# Patient Record
Sex: FEMALE | URBAN - METROPOLITAN AREA
[De-identification: names, ages, dates, MRNs, and addresses within clinical notes are randomized per-mention and may not be internally consistent; named-entity substitution may affect disease eponyms.]

---

## 2019-03-07 ENCOUNTER — IMPORTED ENCOUNTER (OUTPATIENT)
Dept: URBAN - METROPOLITAN AREA CLINIC 1 | Facility: CLINIC | Age: 54
End: 2019-03-07

## 2019-03-07 PROBLEM — H01.8: Noted: 2019-03-07

## 2019-03-07 PROBLEM — H00.034: Noted: 2019-03-07

## 2019-03-07 PROBLEM — H02.844: Noted: 2019-03-07

## 2019-03-07 PROBLEM — H11.152: Noted: 2019-03-07

## 2019-03-07 PROBLEM — H25.813: Noted: 2019-03-07

## 2019-03-07 PROBLEM — H04.123: Noted: 2019-03-07

## 2019-03-07 PROCEDURE — 92002 INTRM OPH EXAM NEW PATIENT: CPT

## 2019-03-07 NOTE — PATIENT DISCUSSION
Preseptal cellulitis OS - The patient's left eyelids were inflammed due to an infection. The deeper orbital tissues were not involved. Treatment with oral and topical antibiotics and warm compresses was recommemded and very close follow up was scheduled.

## 2019-03-07 NOTE — PATIENT DISCUSSION
1.  Abscessed Chalazion REG w/ Cellulitis (PMG saw) -- The patient's left eyelid was inflammed due to an infection. The deeper orbital tissues were not involved. Start Clindamycin PO BID. Continue Polymyxin Q3h OS and Bactrim PO BID (both RX written by other physician). Recommend the use of OTC ATs PRN. Hot compresses were recommemded. Possible I&D if no improvement. 2. Chalazion RUL -- Resolving. 3. SHAZIA OU -- Recommend the use of ATs BID OU. (Sample of Blink given)4. Pinguecula OS -- Use of sunglasses when exposed to UV light and observation is recommended. 5. Cataracts OU -- Observe. Return for an appointment in Wednesday 10 possible I&D with Dr. Shailesh Hough.

## 2019-03-13 ENCOUNTER — IMPORTED ENCOUNTER (OUTPATIENT)
Dept: URBAN - METROPOLITAN AREA CLINIC 1 | Facility: CLINIC | Age: 54
End: 2019-03-13

## 2019-03-13 PROCEDURE — 67700 BLEPHAROTOMY DRG ABSC EYELID: CPT

## 2019-03-13 PROCEDURE — 67800 REMOVE EYELID LESION: CPT

## 2019-03-13 NOTE — PATIENT DISCUSSION
Chalazion abscessed left upper eyelid - Hot compresses TID x 5 minutes for 3 weeks. If without improvement discussed with patient possible Incision and Drainage procedure. Risks and benefits discussed with patient and patient states full understanding.

## 2019-03-13 NOTE — PATIENT DISCUSSION
Incision and drainage of chalazion on right upper eyelid:  After proper informed consent was obtained the patient was taken to the operating room and placed supine on the operating table. The right upper eye lid was prepped in the standard surgical fashion. Xylocaine 1% and Epinephrine was infiltrated around the chalazion. A chalazion speculum was then placed and the eyelid was everted. A cross incision was made through the chalazion and immediate release of the lipogranulomatous material was expressed. A curette was used to further evacuate the chalazion cavity. The speculum was removed ointment was applied and a pressure patch was placed. The patient tolerated the procedure well and there were no complications. The patient was instructed to use Maxitrol nina qhs OD.

## 2019-03-13 NOTE — PATIENT DISCUSSION
Chalazion RUL--Patient was compliant with hot compresses without resolution. Discussed option for Incision and Drainage. Risks and Benefits discussed with patient. Patient stated complete understanding and would like to proceed with procedure.

## 2019-03-13 NOTE — PATIENT DISCUSSION
Incision and drainage of abscessed chalazion on left upper eyelid: After proper informed consent was obtained the patient was taken to the operating room and placed supine on the operating table. The left upper eyelid was prepped in the standard surgical fashion. Xylocaine 1% and Epinephrine was infiltrated around the chalazion. A chalazion speculum was then placed and the eyelid was everted. A cross incision was made through the chalazion and immediate release of the lipogranulomatous material was expressed. A curette was used to further evacuate the chalazion cavity. The speculum was removed Maxitrol ointment was applied and a pressure patch was placed. The patient tolerated the procedure well and there were no complications. The patient was instructed to use INSERT NAME OF OINTMENT HERE  and warm compresses BID.

## 2019-03-13 NOTE — PATIENT DISCUSSION
1.  Chalazion Abscessed REG -- Patient was compliant with hot compresses and Doxy PO without resolution. Discussed option for Incision and Drainage. Risks and Benefits discussed with patient. Patient stated complete understanding and would like to proceed with procedure today in office. 2.  Chalazion RUL--Patient was compliant with hot compresses without resolution. Discussed option for Incision and Drainage. Risks and Benefits discussed with patient. Patient stated complete understanding and would like to proceed with procedure in office today. Incision and drainage of abscessed chalazion on left upper eyelid and chalazion on right upper eyelid. After proper informed consent was obtained the patient was taken to the operating room and placed supine on the operating table. The left upper eyelid and right upper eyelid were prepped in the standard surgical fashion. Xylocaine 1% and Epinephrine was infiltrated around the chalazions. A chalazion speculum was then placed REG. A cross incision was made through the chalazion and immediate release of the lipogranulomatous material was expressed. A curette was used to further evacuate the chalazion cavity. The speculum was removed Maxitrol ointment was applied and a pressure patch was placed. A chalazion speculum was then placed RUL. A cross incision was made through the chalazion and immediate release of the lipogranulomatous material was expressed. A curette was used to further evacuate the chalazion cavity. The speculum was removed Maxitrol ointment was applied. No patch was placed due to minimal bleeding. PT was instructed to use Neosporin nina BID REG and RUL x 1 week. RTC: 1 month with Dr. Scarlet French for 30.

## 2019-03-14 PROBLEM — H00.034: Noted: 2019-03-13

## 2019-03-14 PROBLEM — H00.11: Noted: 2019-03-13

## 2019-03-14 PROBLEM — H00.11: Noted: 2019-03-14

## 2019-04-05 ENCOUNTER — IMPORTED ENCOUNTER (OUTPATIENT)
Dept: URBAN - METROPOLITAN AREA CLINIC 1 | Facility: CLINIC | Age: 54
End: 2019-04-05

## 2019-04-05 PROBLEM — H01.004: Noted: 2019-04-05

## 2019-04-05 PROBLEM — H00.11: Noted: 2019-04-05

## 2019-04-05 PROBLEM — H01.005: Noted: 2019-04-05

## 2019-04-05 PROBLEM — H04.123: Noted: 2019-04-05

## 2019-04-05 PROBLEM — H00.14: Noted: 2019-04-05

## 2019-04-05 PROBLEM — H25.13: Noted: 2019-04-05

## 2019-04-05 PROBLEM — H11.152: Noted: 2019-04-05

## 2019-04-05 PROBLEM — H17.821: Noted: 2019-04-05

## 2019-04-05 PROBLEM — H01.002: Noted: 2019-04-05

## 2019-04-05 PROBLEM — H01.001: Noted: 2019-04-05

## 2019-04-05 PROCEDURE — 92012 INTRM OPH EXAM EST PATIENT: CPT

## 2019-04-05 NOTE — PATIENT DISCUSSION
1.  Recurrent RUL & REG Chalazion -- Not compliant w/ hot compresses this past week. Restart Hot Moist Compresses TID x 5 minutes for 3 weeks. If without improvement discussed with patient possible Incision and Drainage procedure. Risks and benefits discussed with patient and patient states full understanding. Begin Ocusoft Hypochlor Spray BID to lids. Begin Doxycycline 100mg PO Qday X's 30 days (Dispensed #30 & ERx'd). If compliant w/ no improvement will plan for I&D when returns in 3 weeks. 2.  Anterior Blepharitis OU - Daily Hot compresses and lid scrubs were recommended. 3. Dry Eyes OU -The use/continuation of artificial tears were recommended. 4.  Peripheral Corneal Scar OD 5. Cataracts OU -- Observe for now without intervention. The patient was advised to contact us if any change or worsening of vision. 6. Pinguecula OS -- Use of sunglasses when exposed to UV light and observation is recommended. Return for an appointment in 3 WKS for a 10 / Possible I&D (Wednesday Afternoon) with Dr. Laquita Isaac.

## 2019-04-05 NOTE — PATIENT DISCUSSION
Chalazion left upper eyelid -Hot compresses TID x 5 minutes for 3 weeks. If without improvement discussed with patient possible Incision and Drainage procedure. Risks and benefits discussed with patient and patient states full understanding.

## 2019-04-05 NOTE — PATIENT DISCUSSION
Chalazion REG--Patient was compliant with hot compresses without resolution. Discussed option for Incision and Drainage. Risks and Benefits discussed with patient. Patient stated complete understanding and would like to proceed with procedure.

## 2019-05-08 ENCOUNTER — IMPORTED ENCOUNTER (OUTPATIENT)
Dept: URBAN - METROPOLITAN AREA CLINIC 1 | Facility: CLINIC | Age: 54
End: 2019-05-08

## 2019-05-08 PROCEDURE — 99213 OFFICE O/P EST LOW 20 MIN: CPT

## 2019-07-19 ENCOUNTER — IMPORTED ENCOUNTER (OUTPATIENT)
Dept: URBAN - METROPOLITAN AREA CLINIC 1 | Facility: CLINIC | Age: 54
End: 2019-07-19

## 2019-07-19 PROBLEM — H00.11: Noted: 2019-07-19

## 2019-07-19 PROBLEM — H00.14: Noted: 2019-07-19

## 2019-07-19 PROBLEM — H00.12: Noted: 2019-07-19

## 2019-07-19 PROCEDURE — 92012 INTRM OPH EXAM EST PATIENT: CPT

## 2019-07-19 NOTE — PATIENT DISCUSSION
1.  Recurrent RUL RLL & REG Chalazion -- Hot compresses TID x 5 minutes for 3 weeks. If without improvement discussed with patient possible Incision and Drainage procedure. Risks and benefits discussed with patient and patient states full understanding. Begin Doxycycline 50mg Qday X's 10 days (ERx'd). Return for an appointment on Wednesday 7/31/19 for 10 / Possible I&D with Dr. Zoltan Sweeney.

## 2019-07-31 ENCOUNTER — IMPORTED ENCOUNTER (OUTPATIENT)
Dept: URBAN - METROPOLITAN AREA CLINIC 1 | Facility: CLINIC | Age: 54
End: 2019-07-31

## 2019-07-31 PROCEDURE — 11900 INJECT SKIN LESIONS </W 7: CPT

## 2019-07-31 PROCEDURE — 67805 REMOVE EYELID LESIONS: CPT

## 2019-07-31 PROCEDURE — 67800 REMOVE EYELID LESION: CPT

## 2019-07-31 NOTE — PATIENT DISCUSSION
Removal of Multiple Chalazion Different Lids (RUL and REG): The patient was taken to the operating room and placed supine on the operating table. The eyes were prepped and draped in the standard surgical fashion. Xylocaine 1% and Epinephrine was infiltrated around the chalazia. A chalazion speculum was then placed and the eyelid was everted. A cross incision was made through the chalazia and immediate release of the lipogranulomatous material was expressed. A curet was used to further evacuate the chalazia cavities and a kenalog injection was performed to RUL and REG. The speculum was removed and ointment was applied. A pressure patch was then placed. The patient tolerated the procedure well and there were no complications. The patient was instructed to use Maxitrol ointment BID OU x10 days then DC (erx). Begin Doxycycline 50mg PO QD x 1 month disp 30 (erx). The patient was instructed to use Hot compresses BID to lids continuously. Return for an appointment in 1 month 10 with Dr. Domi Ackerman.

## 2022-04-02 ASSESSMENT — VISUAL ACUITY
OS_CC: 20/25
OD_CC: 20/20
OD_CC: 20/20
OD_CC: 20/20-2
OS_CC: 20/25
OS_CC: 20/25
OD_CC: 20/25-2
OS_CC: 20/25

## 2022-04-02 ASSESSMENT — TONOMETRY
OS_IOP_MMHG: 12
OD_IOP_MMHG: 13
OD_IOP_MMHG: 10
OS_IOP_MMHG: 13

## 2022-06-30 ENCOUNTER — TELEPHONE (OUTPATIENT)
Dept: PHYSICAL THERAPY | Age: 57
End: 2022-06-30

## 2022-07-05 ENCOUNTER — HOSPITAL ENCOUNTER (OUTPATIENT)
Dept: PHYSICAL THERAPY | Age: 57
Discharge: HOME | End: 2022-07-05
Payer: COMMERCIAL

## 2022-07-05 PROCEDURE — 97535 SELF CARE MNGMENT TRAINING: CPT

## 2022-07-05 PROCEDURE — 97110 THERAPEUTIC EXERCISES: CPT

## 2022-07-05 PROCEDURE — 97162 PT EVAL MOD COMPLEX 30 MIN: CPT

## 2022-07-05 NOTE — PROGRESS NOTES
PT DAILY TREATMENT NOTE 10-18    Patient Name: Nicola Mcconnell  Date:2022  : 1965  [x]  Patient  Verified  Payor: BLUE CROSS / Plan: [facility] PPO / Product Type: PPO /    In time:210  Out time:245  Total Treatment Time (min): 35  Visit #: 1 of 8    Medicare/BCBS Only   Total Timed Codes (min):  23 1:1 Treatment Time:  35       Treatment Area: Right lumbar radiculopathy [M54.16]    SUBJECTIVE  Pain Level (0-10 scale): 6-7  Any medication changes, allergies to medications, adverse drug reactions, diagnosis change, or new procedure performed?: [x] No    [] Yes (see summary sheet for update)  Subjective functional status/changes:   [] No changes reported  See eval    OBJECTIVE      12 min [x]Eval                  []Re-Eval       15 min Therapeutic Exercise:  [] See flow sheet :   Rationale: increase ROM and increase strength to improve the patients ability to perform daily activities   8 min Self Care/Home Management: HEP progression; positioning    Rationale: increase ROM and decrease pain  to improve the patients ability to perform daily activities        With   [] TE   [] TA   [] neuro   [] other: Patient Education: [x] Review HEP    [] Progressed/Changed HEP based on:   [] positioning   [] body mechanics   [] transfers   [] heat/ice application    [] other:      Other Objective/Functional Measures:      Pain Level (0-10 scale) post treatment: 5    ASSESSMENT/Changes in Function: see POC    Patient will continue to benefit from skilled PT services to modify and progress therapeutic interventions, address functional mobility deficits, address strength deficits, analyze and address soft tissue restrictions, analyze and cue movement patterns and assess and modify postural abnormalities to attain remaining goals. [x]  See Plan of Care  []  See progress note/recertification  []  See Discharge Summary         Progress towards goals / Updated goals:  Short Term Goals:  To be accomplished in 1 weeks:  1. I and compliant with HEP for self management of symptoms. IE:issued HEP  Long Term Goals: To be accomplished in 6 weeks:  1. Improve FOTO to 63 to indicate improved function with daily activities. IE:46   2. Patient will report >=50% improvement with right radicular symptoms to increase ease with standing longer than 1 hour. IE;0%  3. Increase right hip and hamstring strength to 4+/5 to improve stability for ambulating several blocks.   IE: 4-/5 grossly   PLAN  []  Upgrade activities as tolerated     [x]  Continue plan of care  []  Update interventions per flow sheet       []  Discharge due to:_  []  Other:_      Melani Davis, MPT, CMTPT 7/5/2022  3:06 PM    Future Appointments   Date Time Provider Paula Kassie   7/12/2022 12:45 PM Chung, [address] [facility]   7/19/2022 12:45 [facility]   7/26/2022  8:00 AM Ted Hess MD [facility] BS AMB   7/26/2022 10:30 AM [facility]   8/2/2022  9:45 AM Eric Schafer, PT [facility]   8/9/2022  9:45 AM Chung, [address] [facility]

## 2022-07-05 NOTE — PROGRESS NOTES
[facility]  [address]  [place], [address]  [phone] fax    Plan of Care/ Statement of Necessity for Physical Therapy Services    Patient name: Nicola Mcconnell Start of Care: 2022   Referral source: Marcus Soler MD : 1965    Medical Diagnosis: Right lumbar radiculopathy [M54.16]  Payor: BLUE CROSS / Plan: [facility] [id number] PPO / Product Type: PPO /  Onset Date:1 year ago with exacerbation 1 month ago    Treatment Diagnosis: LBP with right LE radiculopathy   Prior Hospitalization: see medical history Provider#: [id number]   Medications: Verified on Patient summary List    Comorbidities: Depression ;OA   Prior Level of Function: Able to tolerate prolonged positions; works at [address] [place] [address] and following information is based on the information from the initial evaluation. Assessment/ key information: 61y.o. year old female presents with CC of chronic LBP and right LE radiculopathy with recent exacerbation of symptoms 1 month ago; patient also present with significant right ankle sprain with notable edema and bruising around lateral malleolus and 3rd-5th rays (prescription is only for LBP). Impairments noted today: increased mm restrictions and hypertonicity noted in right T-L paraspinals, QL, and glutes/piriformis; poor TA recruitment and strength; decreased right hip and hamstring strength on right compared to left; decreased hip IR/ER compared to left. Patient will benefit from physical therapy to address deficits, and ultimately to return patient to prior level of function.     Evaluation Complexity History MEDIUM  Complexity : 1-2 comorbidities / personal factors will impact the outcome/ POC ; Examination MEDIUM Complexity : 3 Standardized tests and measures addressing body structure, function, activity limitation and / or participation in recreation  ;Presentation MEDIUM Complexity : Evolving with changing characteristics  ; Clinical Decision Making MEDIUM Complexity : FOTO score of 26-74  Overall Complexity Rating: MEDIUM  Problem List: pain affecting function, decrease strength, decrease ADL/ functional abilitiies, decrease activity tolerance and decrease flexibility/ joint mobility   Treatment Plan may include any combination of the following: Therapeutic exercise, Neuromuscular re-education, Physical agent/modality, Manual therapy and Patient education  Patient / Family readiness to learn indicated by: asking questions, trying to perform skills and interest  Persons(s) to be included in education: patient (P)  Barriers to Learning/Limitations: None  Patient Goal (s): no more pain  Patient Self Reported Health Status: poor  Rehabilitation Potential: good    Short Term Goals: To be accomplished in 1 weeks:  1. I and compliant with HEP for self management of symptoms. Long Term Goals: To be accomplished in 6 weeks:  1. Improve FOTO to 63 to indicate improved function with daily activities. 2. Patient will report >=50% improvement with right radicular symptoms to increase ease with standing longer than 1 hour. 3. Increase right hip and hamstring strength to 4+/5 to improve stability for ambulating several blocks. Frequency / Duration: Patient to be seen 1 times per week for 6 weeks. Patient/ Caregiver education and instruction: Diagnosis, prognosis, exercises   [x]  Plan of care has been reviewed with ABBY Gonzalez, PT 7/5/2022 2:55 PM    ________________________________________________________________________    I certify that the above Therapy Services are being furnished while the patient is under my care. I agree with the treatment plan and certify that this therapy is necessary.     Physician's Signature:____________Date:_________TIME:________     Maine Soler MD  ** Signature, Date and Time must be completed for valid certification **    Please sign and return to [facility]  [address]  [place], [address]  [phone] fax

## 2022-07-12 ENCOUNTER — HOSPITAL ENCOUNTER (OUTPATIENT)
Dept: PHYSICAL THERAPY | Age: 57
Discharge: HOME | End: 2022-07-12
Payer: COMMERCIAL

## 2022-07-12 PROCEDURE — 97110 THERAPEUTIC EXERCISES: CPT

## 2022-07-12 PROCEDURE — 97140 MANUAL THERAPY 1/> REGIONS: CPT

## 2022-07-12 PROCEDURE — 97112 NEUROMUSCULAR REEDUCATION: CPT

## 2022-07-12 NOTE — PROGRESS NOTES
PT DAILY TREATMENT NOTE     Patient Name: Charity Izaguirre  Date:2022  : 1965  [x]  Patient  Verified  Payor: BLUE CROSS / Plan: [facility] PPO / Product Type: PPO /    In time:1121am  Out time:1210am  Total Treatment Time (min): 49  Visit #: 2 of 8    Medicare/BCBS Only   Total Timed Codes (min):  49 1:1 Treatment Time:  38       Treatment Area: Right lumbar radiculopathy [M54.16]    SUBJECTIVE  Pain Level (0-10 scale): 7.5  Any medication changes, allergies to medications, adverse drug reactions, diagnosis change, or new procedure performed?: [x] No    [] Yes (see summary sheet for update)  Subjective functional status/changes:   [] No changes reported  Reports continued pain, numbness, and tingling. OBJECTIVE    Modality rationale: decrease pain and increase tissue extensibility to improve the patients ability to manage ADLs. Min Type Additional Details   10 []  Ice     [x]  heat  []  Ice massage  []  Laser   []  Anodyne Position: prone  Location: right hip/low back   [] Skin assessment post-treatment:  []intact []redness- no adverse reaction    []redness - adverse reaction:     21 min Therapeutic Exercise:  [x] See flow sheet :   Rationale: increase ROM and increase strength to improve the patients ability to manage ADLs. 10 min Neuromuscular Re-education:  [x]  See flow sheet :   Rationale: increase strength, improve coordination, improve balance and increase proprioception  to improve the patients ability to manage ambulatory ADLs with improved stability and reduced radicular sx.    8 min Manual Therapy:  STM right QL and piri   The manual therapy interventions were performed at a separate and distinct time from the therapeutic activities interventions. Rationale: decrease pain, increase ROM and increase tissue extensibility to improve ease of managing functional activities.           With   [] TE   [] TA   [] neuro   [] other: Patient Education: [x] Review You have a telephone follow up appointment scheduled for July 6.   will call you between 11a - 2p.    Discharge Instructions:  1.You should take it easy once you are home. Avoid sitting for any prolonged period of time (no longer than one hour at a time without standing up and stretching).    2. If you are uncomfortable, you may apply ice to the injection site, 20 min on, 1hr off.    3. Do not drive a vehicle or heavy machinery after the procedure.    4. You may resume your normal activity the day after the procedure. Remember that it can take up to 10 days before you begin to feel the effects of the steroid.    5. Do gentle stretches thru out the day.    6. You may restart all medications after the medication, unless noted elsewhere.    7. Be aware that if you are diabetic your blood sugar could increase due to the steroid, follow up with your primary doctor if blood sugar greater than 300.        PRE PROCEDURE INSTRUCTIONS    Stop all Non Steroidal Anti-inflammatory medication two days before your procedure. The list includes but is not limited to the following:    Ibuprofen (Advil, Motrin, Midol IB)   Aspirin, Aspirin containing products (Gibran, Ascriptin, Ecotrin, Bufferin),   Celecoxib (celebrex)  Naproxen (Aleve, Midol Extended Release, Naprosyn),   Ketorolac (Sprix)  Indomethacin (Indocin,)  Meloxicam (Mobic),   Diclofenac (Voltaren)    Stop ALL Aspirin and ALL NSAIDs two days before your procedure.    Nothing to eat or drink _4_ hours before your procedure.     NOTE: If you are diabetic, please do not take your insulin or diabetes medication as you will not be eating before your procedure.    Take your blood pressure/heart medication with a sip of water the morning of your procedure. Your procedure will be cancelled if your blood pressure is too high.    Shower with antibacterial soap (such as dial) the morning of your procedure.    Call to cancel if you are ill or on antibiotics for ANY reason.  [phone]    You MUST have a  accompany you to drive you home.  You CANNOT drive after your procedure.    If your  is not with you when you arrive for your procedure, YOUR PROCEDURE WILL BE CANCELLED.    The pain clinic phone number is [phone].        You will always get our voice mail when you call, please leave a message.   Please leave your name, date of birth and phone number on the voicemail to ensure we can identify you.    The pain clinic will return your call within 2 business days.    HEP    [] Progressed/Changed HEP based on:   [] positioning   [] body mechanics   [] transfers   [] heat/ice application    [] other:      Other Objective/Functional Measures: exercises initiated for first f/u per flowsheet     Pain Level (0-10 scale) post treatment: 7.5    ASSESSMENT/Changes in Function: Pt reports improved pain following manual therapy, despite same pain report upon leaving. Right QL and piri stretch are the most intense activities for her today. Will benefit from continued core and glute work to reduce her pain. Patient will continue to benefit from skilled PT services to modify and progress therapeutic interventions, address functional mobility deficits, address ROM deficits, address strength deficits, analyze and address soft tissue restrictions, analyze and cue movement patterns, analyze and modify body mechanics/ergonomics, assess and modify postural abnormalities, address imbalance/dizziness and instruct in home and community integration to attain remaining goals. []  See Plan of Care  []  See progress note/recertification  []  See Discharge Summary         Progress towards goals / Updated goals:  Short Term Goals: To be accomplished in 1 weeks:  1. I and compliant with HEP for self management of symptoms.   IE:issued HEP      Long Term Goals: To be accomplished in 6 weeks:  1. Improve FOTO to 63 to indicate improved function with daily activities. IE:46   2. Patient will report >=50% improvement with right radicular symptoms to increase ease with standing longer than 1 hour. IE;0%  Current: no change (7/12/2022)  3. Increase right hip and hamstring strength to 4+/5 to improve stability for ambulating several blocks.   IE: 4-/5 grossly     PLAN  []  Upgrade activities as tolerated     [x]  Continue plan of care  []  Update interventions per flow sheet        []  Discharge due to:_  []  Other:_      General Vela PT 7/12/2022  11:26 AM    Future Appointments   Date Time Provider Paula Fernando   7/19/2022 12:45 PM Lizabeth Paez [facility]   7/26/2022  8:00 AM Madisyn Mckinley MD [facility] BS AMB   7/26/2022 10:30 AM Morgan Gomez, PT [facility]   8/2/2022  9:45 AM Morgan Gomez, PT [facility]   8/9/2022  9:45 AM Chung[address]

## 2022-07-19 ENCOUNTER — APPOINTMENT (OUTPATIENT)
Dept: PHYSICAL THERAPY | Age: 57
End: 2022-07-19
Payer: COMMERCIAL

## 2022-07-19 ENCOUNTER — TELEPHONE (OUTPATIENT)
Dept: PHYSICAL THERAPY | Age: 57
End: 2022-07-19

## 2022-07-26 ENCOUNTER — HOSPITAL ENCOUNTER (OUTPATIENT)
Dept: PHYSICAL THERAPY | Age: 57
Discharge: HOME | End: 2022-07-26
Payer: COMMERCIAL

## 2022-07-26 PROBLEM — K29.70 GASTRITIS: Status: ACTIVE | Noted: 2022-07-26

## 2022-07-26 PROBLEM — M47.816 LUMBAR FACET ARTHROPATHY: Status: ACTIVE | Noted: 2022-07-26

## 2022-07-26 PROBLEM — K76.0 NAFLD (NONALCOHOLIC FATTY LIVER DISEASE): Status: ACTIVE | Noted: 2022-07-26

## 2022-07-26 PROBLEM — M50.90 CERVICAL DISC DISEASE: Status: ACTIVE | Noted: 2022-07-26

## 2022-07-26 PROCEDURE — 97112 NEUROMUSCULAR REEDUCATION: CPT

## 2022-07-26 PROCEDURE — 97110 THERAPEUTIC EXERCISES: CPT

## 2022-07-26 PROCEDURE — 97140 MANUAL THERAPY 1/> REGIONS: CPT

## 2022-07-26 NOTE — PROGRESS NOTES
PT DAILY TREATMENT NOTE 10-18    Patient Name: Gretchen Inman  Date:2022  : 1965  [x]  Patient  Verified  Payor: BLUE CROSS / [facility] / Product Type: PPO /    In time:1030  Out time:1120  Total Treatment Time (min): 50  Visit #: 3 of 8    Medicare/BCBS Only   Total Timed Codes (min):  40 1:1 Treatment Time:  40       Treatment Area: Right lumbar radiculopathy [M54.16]    SUBJECTIVE  Pain Level (0-10 scale): 6-7  Any medication changes, allergies to medications, adverse drug reactions, diagnosis change, or new procedure performed?: [x] No    [] Yes (see summary sheet for update)  Subjective functional status/changes:   [] No changes reported  Not much change. OBJECTIVE    Modality rationale: decrease pain to improve the patients ability to tolerate post exercise soreness   10 []  Ice     [x]  heat  []  Ice massage  []  Laser   []  Anodyne Position:prone  Location:right T-L paraspinals   [] Skin assessment post-treatment:  []intact []redness- no adverse reaction    []redness - adverse reaction:         20 min Therapeutic Exercise:  [x] See flow sheet :   Rationale: increase ROM and increase strength to improve the patients ability to perform daily activities      12 min Neuromuscular Re-education:  [x]  See flow sheet :   Rationale: increase strength  to improve the patients ability to recruit TA and gltues for daily activities     8 min Manual Therapy:  grade 2-3 T-L PAs; STM right T-L paraspinals and QL   The manual therapy interventions were performed at a separate and distinct time from the therapeutic activities interventions.   Rationale: decrease pain, increase ROM, increase tissue extensibility, and decrease trigger points to relax mm for daily activities   With   [] TE   [] TA   [] neuro   [] other: Patient Education: [x] Review HEP    [] Progressed/Changed HEP based on:   [] positioning   [] body mechanics   [] transfers   [] heat/ice application    [] other: Other Objective/Functional Measures:      Pain Level (0-10 scale) post treatment: 0    ASSESSMENT/Changes in Function: Positive response to therapy as patient left without pain. Significant restrictions noted right T-L paraspinals and QL. Instructed patient to perform tennis ball release at home to help with restrictions. Difficult to progress patient with patient only attending therapy once/week. Patient will continue to benefit from skilled PT services to modify and progress therapeutic interventions, address functional mobility deficits, address ROM deficits, address strength deficits, analyze and address soft tissue restrictions, analyze and cue movement patterns, and assess and modify postural abnormalities to attain remaining goals. []  See Plan of Care  []  See progress note/recertification  []  See Discharge Summary         Progress towards goals / Updated goals:  Short Term Goals: To be accomplished in 1 weeks:  1. I and compliant with HEP for self management of symptoms. IE:issued HEP  Current: reports compliance 7/26/22 goal met  Long Term Goals: To be accomplished in 6 weeks:  1. Improve FOTO to 63 to indicate improved function with daily activities. IE:46   2. Patient will report >=50% improvement with right radicular symptoms to increase ease with standing longer than 1 hour. IE;0%  Current: no change (7/12/2022)  3. Increase right hip and hamstring strength to 4+/5 to improve stability for ambulating several blocks.   IE: 4-/5 grossly     PLAN  [x]  Upgrade activities as tolerated     []  Continue plan of care  []  Update interventions per flow sheet       []  Discharge due to:_  []  Other:_      Diania Section, MPT, CMTPT 7/26/2022  8:50 AM    Future Appointments   Date Time Provider Paula Fernando   7/26/2022 10:30 AM [place] Sm[place], PT [facility]   8/2/2022  9:45 AM [place] [facility]   8/9/2022  9:45 AM [place], [address] [facility]   8/9/2022  2:00 PM Kaila Figueroa MD [facility] BS AMB

## 2022-08-02 ENCOUNTER — HOSPITAL ENCOUNTER (OUTPATIENT)
Dept: PHYSICAL THERAPY | Age: 57
Discharge: HOME | End: 2022-08-02
Payer: COMMERCIAL

## 2022-08-02 PROCEDURE — 97140 MANUAL THERAPY 1/> REGIONS: CPT

## 2022-08-02 PROCEDURE — 97112 NEUROMUSCULAR REEDUCATION: CPT

## 2022-08-02 PROCEDURE — 97110 THERAPEUTIC EXERCISES: CPT

## 2022-08-02 NOTE — PROGRESS NOTES
PT DAILY TREATMENT NOTE 10    Patient Name: Jarred Torres  Date:2022  : 1965  [x]  Patient  Verified  Payor: BLUE CROSS / Plan: [facility] PPO / Product Type: PPO /    In time:950  Out time:1040  Total Treatment Time (min): 50  Visit #: 4 of 8    Medicare/BCBS Only   Total Timed Codes (min):  40 1:1 Treatment Time:  40       Treatment Area: Right lumbar radiculopathy [M54.16]    SUBJECTIVE  Pain Level (0-10 scale): 7-8  Any medication changes, allergies to medications, adverse drug reactions, diagnosis change, or new procedure performed?: [x] No    [] Yes (see summary sheet for update)  Subjective functional status/changes:   [] No changes reported  I'm feeling great. OBJECTIVE    Modality rationale: decrease pain to improve the patients ability to tolerate post exercise soreness   Min Type Additional Details   10 []  Ice     [x]  heat  []  Ice massage  []  Laser   []  Anodyne Position:prone   Location:right L/S   [] Skin assessment post-treatment:  []intact []redness- no adverse reaction    22 min Therapeutic Exercise:  [] See flow sheet :   Rationale: increase ROM and increase strength to improve the patients ability to perform daily activities      10 min Neuromuscular Re-education:  []  See flow sheet :   Rationale: increase strength and improve coordination  to improve the patients ability to recruit TA and glutes for daily activities     8 min Manual Therapy:  grade 2-3 T-L PAs; STM right T-L paraspinals   The manual therapy interventions were performed at a separate and distinct time from the therapeutic activities interventions.   Rationale: decrease pain, increase ROM, increase tissue extensibility, and decrease trigger points to relax mm for daily activities and abolish radicular symptoms   With   [] TE   [] TA   [] neuro   [] other: Patient Education: [x] Review HEP    [] Progressed/Changed HEP based on:   [] positioning   [] body mechanics   [] transfers   [] heat/ice application    [] other:      Other Objective/Functional Measures:  see below     Pain Level (0-10 scale) post treatment: 2 no numbness    ASSESSMENT/Changes in Function: Patient's subjective high pain ratings do not correlate to ability to perform exercises in clinic. Patient also reports feeling \"great\" , but rates pain as a 7-8/10. When patient was further questioned, she reported she was \"trying to start her day off positive and nice\" by saying she felt great. [place] has been minimal progress, other than mild strength gains in B hips, and patient is only able to attend therapy 1x/week, which makes it difficult to progress patient. Patient will benefit from continuing with PT, with the addition of dry needling to help with soft tissue restrictions. Patient will continue to benefit from skilled PT services to modify and progress therapeutic interventions, address functional mobility deficits, address strength deficits, analyze and address soft tissue restrictions, analyze and cue movement patterns, and assess and modify postural abnormalities to attain remaining goals. []  See Plan of Care  []  See progress note/recertification  []  See Discharge Summary         Progress towards goals / Updated goals:  Short Term Goals: To be accomplished in 1 weeks:  1. I and compliant with HEP for self management of symptoms. IE:issued HEP  Current: reports compliance goal met  Long Term Goals: To be accomplished in 6 weeks:  1. Improve FOTO to 63 to indicate improved function with daily activities. IE:46   Current:53 progressing  2. Patient will report >=50% improvement with right radicular symptoms to increase ease with standing longer than 1 hour. IE;0%  Current:no change 0%  3. Increase right hip and hamstring strength to 4+/5 to improve stability for ambulating several blocks.   IE: 4-/5 grossly   Current:grossly 4/5 B -progressing    PLAN  [x]  Upgrade activities as tolerated     [] Continue plan of care  []  Update interventions per flow sheet       []  Discharge due to:_  []  Other:_      Melanie Carrasco, SHAGGY, CMTPT 8/2/2022  8:37 AM    Future Appointments   Date Time Provider Paula Fernando   8/2/2022  9:45 AM [facility]   8/9/2022  9:45 AM Starla Aviles [facility]   8/9/2022  2:00 PM Eduar Spears MD [facility]

## 2022-08-02 NOTE — PROGRESS NOTES
Request for use of Dry Needling/Intramuscular Manual Therapy  Patient: Buddy Huston     Referral Source: Frank Soler MD  Diagnosis: Right lumbar radiculopathy [M54.16]      : 1965  Date of initial visit: 22   Attended visits: 4/  Missed Visits: 1    Based on findings from the physical therapy examination and evaluation, the evaluating therapist believes the patient, Buddy Huston  would benefit from including Dry Needling as part of the plan of care. Dry needling is a treatment technique utilized in conjunction with other PT interventions to inactivate myofascial trigger points and the pain and dysfunction they cause. Dry Needling is an advanced procedure that requires additional training including greater than 54 hours of intensive course work. Physical Therapists at [address] [place] [address] are trained and/or certified through [employer] for their education. PROCEDURE:  Solid filament sterile needle (typically 0.3mm/30 gauge) inserted into a trigger point  Repeated movements inactivate the trigger points, taking 30-60 seconds per site  Typically consists of 1 dry needling session per week and a possible second treatment including muscle re-education, flexibility, strengthening and other manual techniques to facilitate the benefits of dry needling     BENEFITS:  Inactivation of trigger points  Decreased pain  Increased muscle length  Improved movement patterns  Restoration of function POTENTIAL RISKS:  Post-needling soreness  Infection  Bruising/bleeding  Penetration of a nerve  Pneumothorax   All treating PTs have been thoroughly educated in avoiding adverse reactions    If you agree with this recommendation, please sign this form and fax it to us at [phone]. If you have questions or concerns regarding dry needling or any other treatment we may be providing, please contact us at [phone].     Thank you for allowing us to assist in the care of your patient. Jose Luis Alfonso, PT    8/2/2022 10:33 AM     NOTE TO PHYSICIAN:  PLEASE COMPLETE THE ORDERS BELOW AND   FAX TO [facility]: [phone]  If you are unable to process this request in 24 hours please contact our office:   [phone]    I have read the above request and AGREE to the recommendation of including dry needling as part of the plan of care.       Physicians signature: _________________________Date: _________Time:________

## 2022-08-02 NOTE — PROGRESS NOTES
[facility]  [address]  [place], [address]  [phone] fax    Physical Therapy Progress Note  Patient name: Karolina Sarmiento Start of Care: 22   Referral source: [place] Soler Do, MD : 1965   Medical/Treatment Diagnosis: Right lumbar radiculopathy [M54.16]  Payor: BLUE CROSS / Plan: [id number] PPO / Product Type: PPO /  Onset Date::1 year ago with exacerbation 1 month ago                  Prior Hospitalization: see medical history Provider#: [id number]   Medications: Verified on Patient Summary List    Comorbidities: Depression ;OA   Prior Level of Function: Able to tolerate prolonged positions; works at [employer]                 [facility] from [facility]: 4    Missed Visits: 1  Short Term Goals: To be accomplished in 1 weeks:  1. I and compliant with HEP for self management of symptoms. IE:issued HEP  Current: reports compliance goal met  Long Term Goals: To be accomplished in 6 weeks:  1. Improve FOTO to 63 to indicate improved function with daily activities. IE:46   Current:53 progressing  2. Patient will report >=50% improvement with right radicular symptoms to increase ease with standing longer than 1 hour. IE;0%  Current:no change 0%  3. Increase right hip and hamstring strength to 4+/5 to improve stability for ambulating several blocks. IE: 4-/5 grossly   Current:grossly 4/5 B -progressing    Key Functional Changes: mild strength gain in B hips; FOTO improved by 7 points    Updated Goals: to be achieved in 5 weeks:  1. Improve FOTO to 63 to indicate improved function with daily activities. PN:53 progressing  2. Patient will report >=50% improvement with right radicular symptoms to increase ease with standing longer than 1 hour. PN :no change 0%  3. Increase right hip and hamstring strength to 4+/5 to improve stability for ambulating several blocks.   PN :grossly 4/5 B -progressing    Patient's subjective high pain ratings do not correlate to ability to perform exercises in clinic. Patient also reports feeling \"great\" , but rates pain as a 7-8/10. When patient was further questioned, she reported she was \"trying to start her day off positive and nice\" by saying she felt great. [place] has been minimal progress, other than mild strength gains in B hips, and patient is only able to attend therapy 1x/week, which makes it difficult to progress patient. Patient will benefit from continuing with PT, with the addition of dry needling to help with soft tissue restrictions. ASSESSMENT/RECOMMENDATIONS:  [x]Continue therapy per initial plan/protocol at a frequency of  1 x per week for 5 weeks  []Continue therapy with the following recommended changes:_____________________      _____________________________________________________________________  []Discontinue therapy progressing towards or have reached established goals  []Discontinue therapy due to lack of appreciable progress towards goals  []Discontinue therapy due to lack of attendance or compliance  []Await Physician's recommendations/decisions regarding therapy  []Other:________________________________________________________________    Thank you for this referral.    Gilbert Will, PT 8/2/2022 10:27 AM  NOTE TO PHYSICIAN:  PLEASE COMPLETE THE ORDERS BELOW AND   FAX TO [facility]: [phone]  If you are unable to process this request in 24 hours please contact our office: [phone]    I have read the above report and request that my patient continue as recommended. I have read the above report and request that my patient continue therapy with the following changes/special instructions:__________________________________________________________  I have read the above report and request that my patient be discharged from therapy.     Physicians signature: ______________________________Date: ______Time:______     Ariel Soler MD

## 2022-08-09 ENCOUNTER — HOSPITAL ENCOUNTER (OUTPATIENT)
Dept: PHYSICAL THERAPY | Age: 57
Discharge: HOME | End: 2022-08-09
Payer: COMMERCIAL

## 2022-08-09 PROCEDURE — 97112 NEUROMUSCULAR REEDUCATION: CPT

## 2022-08-09 PROCEDURE — 97140 MANUAL THERAPY 1/> REGIONS: CPT

## 2022-08-09 PROCEDURE — 97110 THERAPEUTIC EXERCISES: CPT

## 2022-08-09 NOTE — PROGRESS NOTES
PT DAILY TREATMENT NOTE     Patient Name: Nicola Mcconnell  Date:2022  : 1965  [x]  Patient  Verified  Payor: BLUE CROSS / Plan: [facility] PPO / Product Type: PPO /    In time:9:50  Out time:10:45  Total Treatment Time (min): 55  Visit #: 1 of 4    Medicare/BCBS Only   Total Timed Codes (min):  45 1:1 Treatment Time:  45       Treatment Area: Right lumbar radiculopathy [M54.16]    SUBJECTIVE  Pain Level (0-10 scale): 5-6/10  Any medication changes, allergies to medications, adverse drug reactions, diagnosis change, or new procedure performed?: [x] No    [] Yes (see summary sheet for update)  Subjective functional status/changes:   [] No changes reported  The patient states that her pain is about the same and it is worse when she has an onset of jolting pain down into her right hip and leg which she feels has been happening with increased frequency lately. OBJECTIVE    Modality rationale: decrease pain and increase tissue extensibility to improve the patients ability to improve ADL ease. Min Type Additional Details   10 []  Ice     []  heat  []  Ice massage  []  Laser   []  Anodyne Position:  Location:   [] Skin assessment post-treatment:  []intact []redness- no adverse reaction    []redness - adverse reaction:   21 min Therapeutic Exercise:  [] See flow sheet :   Rationale: increase ROM and increase strength to improve the patients ability to improve ADL ease. 12 min Neuromuscular Re-education:  []  See flow sheet :   Rationale: increase ROM, increase strength, and improve coordination  to improve the patients ability to improve ADL ease. 12 min Manual Therapy:  graston in prone/prayer stretch to right QL and right lumbar paraspinal.    Long axis distraction of right LE with mulligan strap 15\" x 5 with the patient in prone.     The manual therapy interventions were performed at a separate and distinct time from the therapeutic activities interventions. Rationale: decrease pain, increase ROM, and increase tissue extensibility to improve ADL ease. With   [] TE   [] TA   [] neuro   [] other: Patient Education: [x] Review HEP    [] Progressed/Changed HEP based on:   [] positioning   [] body mechanics   [] transfers   [] heat/ice application    [] other:      Other Objective/Functional Measures:   Notable flexion bias appreciated during session today. Opted to perform [address], and SKC which did abolish pain. Pain Level (0-10 scale) post treatment: 3/10    ASSESSMENT/Changes in Function: The patient appears to have a flexion bias noted. She had her right SIJ noted as an upslip with painful foraminal closing appear to cause most peripheralization. The patient performed [facility] and [address] without any pain. She left in minimal pain post session. Patient will continue to benefit from skilled PT services to modify and progress therapeutic interventions, address functional mobility deficits, address ROM deficits, address strength deficits, analyze and address soft tissue restrictions, analyze and cue movement patterns, analyze and modify body mechanics/ergonomics, assess and modify postural abnormalities, and instruct in home and community integration to attain remaining goals. []  See Plan of Care  []  See progress note/recertification  []  See Discharge Summary         Progress towards goals / Updated goals:  Updated Goals: to be achieved in 5 weeks:  1. Improve FOTO to 63 to indicate improved function with daily activities. PN:53 progressing  2. Patient will report >=50% improvement with right radicular symptoms to increase ease with standing longer than 1 hour. PN :no change 0%  3. Increase right hip and hamstring strength to 4+/5 to improve stability for ambulating several blocks.   PN :grossly 4/5 B -progressing           PLAN  []  Upgrade activities as tolerated     []  Continue plan of care  []  Update interventions per flow sheet       [] Discharge due to:_  []  Other:_      Mary Husbands, PT 8/9/2022  9:56 AM    Future Appointments   Date Time Provider Paula Kassie   8/16/2022 12:45 PM Stephanie Adams [facility]   8/23/2022  9:45 AM Stephanie Adams [facility]   8/30/2022  9:45 AM Amos Maya, PT [facility]   9/1/2022  9:30 AM Wili Piña MD [facility] AMB

## 2022-08-15 ENCOUNTER — TELEPHONE (OUTPATIENT)
Dept: PHYSICAL THERAPY | Age: 57
End: 2022-08-15

## 2022-08-16 ENCOUNTER — APPOINTMENT (OUTPATIENT)
Dept: PHYSICAL THERAPY | Age: 57
End: 2022-08-16
Payer: COMMERCIAL

## 2022-08-22 ENCOUNTER — TELEPHONE (OUTPATIENT)
Dept: PHYSICAL THERAPY | Age: 57
End: 2022-08-22

## 2022-08-23 ENCOUNTER — APPOINTMENT (OUTPATIENT)
Dept: PHYSICAL THERAPY | Age: 57
End: 2022-08-23
Payer: COMMERCIAL

## 2022-08-30 ENCOUNTER — TELEPHONE (OUTPATIENT)
Dept: PHYSICAL THERAPY | Age: 57
End: 2022-08-30

## 2022-09-01 ENCOUNTER — OFFICE VISIT (OUTPATIENT)
Dept: ORTHOPEDIC SURGERY | Age: 57
End: 2022-09-01
Payer: COMMERCIAL

## 2022-09-01 VITALS
BODY MASS INDEX: 23.56 KG/M2 | SYSTOLIC BLOOD PRESSURE: 124 MMHG | DIASTOLIC BLOOD PRESSURE: 84 MMHG | TEMPERATURE: 97.5 F | HEART RATE: 85 BPM | RESPIRATION RATE: 18 BRPM | HEIGHT: 60 IN | WEIGHT: 120 LBS | OXYGEN SATURATION: 98 %

## 2022-09-01 DIAGNOSIS — M51.26 PROTRUSION OF LUMBAR INTERVERTEBRAL DISC: ICD-10-CM

## 2022-09-01 DIAGNOSIS — G89.29 CHRONIC NECK PAIN: ICD-10-CM

## 2022-09-01 DIAGNOSIS — M54.16 LUMBAR NEURITIS: Primary | ICD-10-CM

## 2022-09-01 DIAGNOSIS — M54.2 CHRONIC NECK PAIN: ICD-10-CM

## 2022-09-01 PROCEDURE — 99204 OFFICE O/P NEW MOD 45 MIN: CPT | Performed by: PHYSICAL MEDICINE & REHABILITATION

## 2022-09-01 RX ORDER — GABAPENTIN 300 MG/1
CAPSULE ORAL
Qty: 60 CAPSULE | Refills: 2 | Status: SHIPPED | OUTPATIENT
Start: 2022-09-01

## 2022-09-01 NOTE — PATIENT INSTRUCTIONS
Learning About Lumbar Epidural Steroid Injections  What is a lumbar epidural steroid injection? A lumbar epidural injection is a shot into the epidural space--the area in your back around the spinal cord. The shot may help reduce pain, tingling, or numbness in your back, buttock, or leg. The shot may have a steroid to reduce pain and swelling and a local anesthetic to numb nerves. How is a lumbar epidural steroid injection done? The doctor may use an imaging test before or during your injection. This can be an MRI, a CT scan, or an X-ray. These tests can show where your nerve problems are. After finding the right spot, the doctor may inject a numbing medicine into the skin where you will get the steroid injection. Then he or she puts the needle for the steroid into the numbed area. You may feel some pressure. You could feel some stinging or burning during the injection. How long does an epidural steroid injection take? It takes about 10 to 15 minutes to get this injection. You will probably go home about 20 to 30 minutes after you get it. What can you expect after a lumbar epidural steroid injection? If your injection had local anesthetic and a steroid, your legs may feel heavy or numb right after. You will probably be able to walk. But you may need to be extra careful. Take care not to lose your balance, and be sure to follow your doctor's instructions. If your injection contained local anesthetic, you may feel better right away. But this pain relief will last only a few hours. Your pain will probably return. This is because the steroids have not started working yet. Before the steroids start to work, your back may be sore for a few days. These injections don't always work. When they do, it takes 1 to 5 days. This pain relief can last for several days to a few months or longer. You may want to do less than normal for a few days. But you may also be able to return to your daily routine.   Some people are dizzy or feel sick to their stomach after getting this injection. These symptoms usually don't last very long. If your pain is better, you may be able to keep doing your normal activities or physical therapy. But try not to overdo it, even if your back pain has improved a lot. If your pain is only a little better or if it comes back, your doctor may recommend another injection in a few weeks. If your pain has not changed, talk to your doctor about other treatment choices. Follow-up care is a key part of your treatment and safety. Be sure to make and go to all appointments, and call your doctor if you are having problems. It's also a good idea to know your test results and keep a list of the medicines you take. Where can you learn more? Go to http://www.gray.com/  Enter H162 in the search box to learn more about \"Learning About Lumbar Epidural Steroid Injections. \"  Current as of: July 1, 2021               Content Version: 13.2  © 2006-2022 [employer]. Care instructions adapted under license by [employer] (which disclaims liability or warranty for this information). If you have questions about a medical condition or this instruction, always ask your healthcare professional. [place] [address] any warranty or liability for your use of this information.

## 2022-09-01 NOTE — PROGRESS NOTES
[facility] [address]  [address], [address]  [facility], [address]  Phone: [phone]  Fax: [phone]        Jack Wells  : 1965  PCP: None    NEW PATIENT EVALUATION      ASSESSMENT AND PLAN    Diagnoses and all orders for this visit:    1. Lumbar neuritis  -     gabapentin (NEURONTIN) 300 mg capsule; One po qhs x 1 week then increase to one po bid  -     SCHEDULE SURGERY    2. Protrusion of lumbar intervertebral disc  -     gabapentin (NEURONTIN) 300 mg capsule; One po qhs x 1 week then increase to one po bid    3. Chronic neck pain       Estrellita Faye is a 61 y.o. female with a history of psoriatic arthritis presenting with right lumbosacral radicular pain x6 months unresponsive to oral corticosteroids and physical therapy. MRI shows right-sided protrusion at L3, clinically her symptoms are lower lumbar, likely related to her transitional sacral vertebrae. Trial of Gabapentin 300 mg. Take 1 po QHS x one week then increase to 1 po BID thereafter  Risks, benefits, alternatives, and limitations of SNRB discussed with patient. Schedule right L5, S1 SNRB  Advised to continue HEP as tolerated. Follow-up and Dispositions    Return in about 4 weeks (around 2022) for after Injections. HISTORY OF PRESENT ILLNESS  Estrellita Faye is seen today in consultation RUE and RLE pain x 6 months. Pt reports chronic neck pain intermittently radiating into her RUE x 3-4 months. She has intermittent paresthesias RUE. Her main complaint is her low back pain radiating into her right anterior thigh and calf to her foot x 6 months. Her pain is exacerbated with physical activity. Pt first noticed the pain after doing yard work. She has numbness and tingling RLE. She reports some benefit with MDP, though it made her sick. Pt denies any recent GI ulcers, bleeds or renal dysfunction. She reports little benefit with PT.      Denies persistent fevers, chills, weight changes, saddle paresthesias, and neurogenic bowel or bladder symptoms. Affirms urinary frequency    Pain Assessment  2022   Location of Pain Leg;Hip   Location Modifiers Right   Severity of Pain 4   Quality of Pain Sharp; Aching   Duration of Pain Persistent   Frequency of Pain Constant   Aggravating Factors Bending;Stretching;Standing;Walking   Limiting Behavior Yes   Relieving Factors Rest       Onset of pain: 3/2022 for low back pain/RLE, 2022 for RUE (intermittent), neck pain chronic      Investigations:   L MRI 2022 (MRICT): right L3-4 protrusion, left L4-5 protrusion, transitional S1, incomplete fusion S1/S2 vertebral bodies  L XR 2022: mild degenerative changes  C MRI : mild degenerative changes  Spine surgery consult: none    Treatments:  Physical therapy: 2022 - little benefit  Spinal injections: Cervical injections by Dr. Phyllis Fraser in the past without significant benefit. Spinal surgery- no  Beneficial medications: MDP - benefit but made her sick  Failed medications: NSAIDs - PUD    Work Status: works at [employer] since 2019, right-handed  Pertinent PMHx:  YONUGBLOOD, anxiety, GERD, PUD, osteoporosis, psoriatic arthritis-Dr. Soler.      Visit Vitals  /84 (BP 1 Location: Right arm, BP Patient Position: Sitting, BP Cuff Size: Adult)   Pulse 85   Temp 97.5 °F (36.4 °C) (Temporal)   Resp 18   Ht 5' (1.524 m)   Wt 120 lb (54.4 kg)   SpO2 98% Comment: RA   BMI 23.44 kg/m²       PHYSICAL EXAM    TTP right L5-S1, right sciatic notch, pain with extension  Negative Quiroz's  FROM right shoulder  DTRs 1+ UE, 2+ LE  UE strength intact except for intrinsics 4/5  LE strength intact  SLR positive on the right 90 degrees  Sensation intact to light touch        Past Medical History:   Diagnosis Date    GERD (gastroesophageal reflux disease)     Headache(784.0)     Hypercholesterolemia     Insomnia             Past Surgical History:   Procedure Laterality Date    HX  SECTION 2005         Current Outpatient Medications   Medication Sig Dispense Refill    gabapentin (NEURONTIN) 300 mg capsule One po qhs x 1 week then increase to one po bid 60 Capsule 2    multivitamin (ONE A DAY) tablet Take 1 Tab by mouth daily.

## 2022-09-01 NOTE — H&P (VIEW-ONLY)
[facility] [address], [address]  [place]  Phone: [phone]  Fax: [phone]        David Garcia  : 1965  PCP: None    NEW PATIENT EVALUATION      ASSESSMENT AND PLAN    Diagnoses and all orders for this visit:    1. Lumbar neuritis  -     gabapentin (NEURONTIN) 300 mg capsule; One po qhs x 1 week then increase to one po bid  -     SCHEDULE SURGERY    2. Protrusion of lumbar intervertebral disc  -     gabapentin (NEURONTIN) 300 mg capsule; One po qhs x 1 week then increase to one po bid    3. Chronic neck pain       Buddy Huston is a 61 y.o. female with a history of psoriatic arthritis presenting with right lumbosacral radicular pain x6 months unresponsive to oral corticosteroids and physical therapy. MRI shows right-sided protrusion at L3, clinically her symptoms are lower lumbar, likely related to her transitional sacral vertebrae. Trial of Gabapentin 300 mg. Take 1 po QHS x one week then increase to 1 po BID thereafter  Risks, benefits, alternatives, and limitations of SNRB discussed with patient. Schedule right L5, S1 SNRB  Advised to continue HEP as tolerated. Follow-up and Dispositions    Return in about 4 weeks (around 2022) for after Injections. HISTORY OF PRESENT ILLNESS  Buddy Huston is seen today in consultation RUE and RLE pain x 6 months. Pt reports chronic neck pain intermittently radiating into her RUE x 3-4 months. She has intermittent paresthesias RUE. Her main complaint is her low back pain radiating into her right anterior thigh and calf to her foot x 6 months. Her pain is exacerbated with physical activity. Pt first noticed the pain after doing yard work. She has numbness and tingling RLE. She reports some benefit with MDP, though it made her sick. Pt denies any recent GI ulcers, bleeds or renal dysfunction. She reports little benefit with PT.      Denies persistent fevers, chills, weight changes, saddle paresthesias, and neurogenic bowel or bladder symptoms. Affirms urinary frequency    Pain Assessment  2022   Location of Pain Leg;Hip   Location Modifiers Right   Severity of Pain 4   Quality of Pain Sharp; Aching   Duration of Pain Persistent   Frequency of Pain Constant   Aggravating Factors Bending;Stretching;Standing;Walking   Limiting Behavior Yes   Relieving Factors Rest       Onset of pain: 3/2022 for low back pain/RLE, 2022 for RUE (intermittent), neck pain chronic      Investigations:   L MRI 2022 (MRICT): right L3-4 protrusion, left L4-5 protrusion, transitional S1, incomplete fusion S1/S2 vertebral bodies  L XR 2022: mild degenerative changes  C MRI : mild degenerative changes  Spine surgery consult: none    Treatments:  Physical therapy: 2022 - little benefit  Spinal injections: Cervical injections by Dr. Armenta Daily in the past without significant benefit. Spinal surgery- no  Beneficial medications: MDP - benefit but made her sick  Failed medications: NSAIDs - PUD    Work Status: works at [employer] since 2019, right-handed  Pertinent PMHx:  YOUNGBLOOD, anxiety, GERD, PUD, osteoporosis, psoriatic arthritis-Dr. Soler.      Visit Vitals  /84 (BP 1 Location: Right arm, BP Patient Position: Sitting, BP Cuff Size: Adult)   Pulse 85   Temp 97.5 °F (36.4 °C) (Temporal)   Resp 18   Ht 5' (1.524 m)   Wt 120 lb (54.4 kg)   SpO2 98% Comment: RA   BMI 23.44 kg/m²       PHYSICAL EXAM    TTP right L5-S1, right sciatic notch, pain with extension  Negative Quiroz's  FROM right shoulder  DTRs 1+ UE, 2+ LE  UE strength intact except for intrinsics 4/5  LE strength intact  SLR positive on the right 90 degrees  Sensation intact to light touch        Past Medical History:   Diagnosis Date    GERD (gastroesophageal reflux disease)     Headache(784.0)     Hypercholesterolemia     Insomnia             Past Surgical History:   Procedure Laterality Date    HX  SECTION 2005         Current Outpatient Medications   Medication Sig Dispense Refill    gabapentin (NEURONTIN) 300 mg capsule One po qhs x 1 week then increase to one po bid 60 Capsule 2    multivitamin (ONE A DAY) tablet Take 1 Tab by mouth daily.

## 2022-09-01 NOTE — PROGRESS NOTES
Antoinette Triana presents today for   Chief Complaint   Patient presents with    Back Pain    Hip Pain       Is someone accompanying this pt? no    Is the patient using any DME equipment during OV? no    Depression Screening:  3 most recent PHQ Screens 3/10/2014   Little interest or pleasure in doing things Not at all   Feeling down, depressed, irritable, or hopeless Not at all   Total Score PHQ 2 0       Learning Assessment:  Learning Assessment 3/10/2014   PRIMARY LEARNER Patient   HIGHEST LEVEL OF EDUCATION - PRIMARY LEARNER  > 4 YEARS OF COLLEGE   BARRIERS PRIMARY LEARNER VISUAL   PRIMARY LANGUAGE Mauritanian   LEARNER PREFERENCE PRIMARY DEMONSTRATION   ANSWERED BY patient   RELATIONSHIP SELF       Abuse Screening:  No flowsheet data found. Fall Risk  No flowsheet data found. OPIOID RISK TOOL  No flowsheet data found. Coordination of Care:  1. Have you been to the ER, urgent care clinic since your last visit? Yes twisted ankle  Hospitalized since your last visit? no    2. Have you seen or consulted any other health care providers outside of the [address] [place] [address] since your last visit? no Include any pap smears or colon screening.  no

## 2022-09-06 ENCOUNTER — APPOINTMENT (OUTPATIENT)
Dept: PHYSICAL THERAPY | Age: 57
End: 2022-09-06

## 2022-09-20 ENCOUNTER — HOSPITAL ENCOUNTER (OUTPATIENT)
Age: 57
Setting detail: OUTPATIENT SURGERY
Discharge: HOME | End: 2022-09-20
Attending: PHYSICAL MEDICINE & REHABILITATION | Admitting: PHYSICAL MEDICINE & REHABILITATION
Payer: COMMERCIAL

## 2022-09-20 ENCOUNTER — APPOINTMENT (OUTPATIENT)
Dept: GENERAL RADIOLOGY | Age: 57
End: 2022-09-20
Attending: PHYSICAL MEDICINE & REHABILITATION
Payer: COMMERCIAL

## 2022-09-20 VITALS
SYSTOLIC BLOOD PRESSURE: 117 MMHG | TEMPERATURE: 98.9 F | OXYGEN SATURATION: 95 % | RESPIRATION RATE: 16 BRPM | HEART RATE: 60 BPM | DIASTOLIC BLOOD PRESSURE: 68 MMHG

## 2022-09-20 PROCEDURE — 74011000636 HC RX REV CODE- 636: Performed by: PHYSICAL MEDICINE & REHABILITATION

## 2022-09-20 PROCEDURE — 77030003672 HC NDL SPN HALY -A: Performed by: PHYSICAL MEDICINE & REHABILITATION

## 2022-09-20 PROCEDURE — 64484 NJX AA&/STRD TFRM EPI L/S EA: CPT | Performed by: PHYSICAL MEDICINE & REHABILITATION

## 2022-09-20 PROCEDURE — 74011000250 HC RX REV CODE- 250: Performed by: PHYSICAL MEDICINE & REHABILITATION

## 2022-09-20 PROCEDURE — 77030003669 HC NDL SPN COOK -B: Performed by: PHYSICAL MEDICINE & REHABILITATION

## 2022-09-20 PROCEDURE — 74011250636 HC RX REV CODE- 250/636: Performed by: PHYSICAL MEDICINE & REHABILITATION

## 2022-09-20 PROCEDURE — 76010000009 HC PAIN MGT 0 TO 30 MIN PROC: Performed by: PHYSICAL MEDICINE & REHABILITATION

## 2022-09-20 PROCEDURE — 64483 NJX AA&/STRD TFRM EPI L/S 1: CPT | Performed by: PHYSICAL MEDICINE & REHABILITATION

## 2022-09-20 PROCEDURE — 77030039433 HC TY MYLEOGRAM BD -B: Performed by: PHYSICAL MEDICINE & REHABILITATION

## 2022-09-20 PROCEDURE — 2709999900 HC NON-CHARGEABLE SUPPLY: Performed by: PHYSICAL MEDICINE & REHABILITATION

## 2022-09-20 RX ORDER — DIAZEPAM 5 MG/1
5-20 TABLET ORAL ONCE
Status: DISCONTINUED | OUTPATIENT
Start: 2022-09-20 | End: 2022-09-20 | Stop reason: HOSPADM

## 2022-09-20 RX ORDER — LIDOCAINE HYDROCHLORIDE 10 MG/ML
INJECTION, SOLUTION EPIDURAL; INFILTRATION; INTRACAUDAL; PERINEURAL AS NEEDED
Status: DISCONTINUED | OUTPATIENT
Start: 2022-09-20 | End: 2022-09-20 | Stop reason: HOSPADM

## 2022-09-20 RX ORDER — DEXAMETHASONE SODIUM PHOSPHATE 100 MG/10ML
INJECTION INTRAMUSCULAR; INTRAVENOUS AS NEEDED
Status: DISCONTINUED | OUTPATIENT
Start: 2022-09-20 | End: 2022-09-20 | Stop reason: HOSPADM

## 2022-09-20 NOTE — PROCEDURES
SELECTIVE NERVE ROOT BLOCK PROCEDURE NOTE      Patient Name: Lei Goldberg  Date of Procedure: September 20, 2022  Preoperative Diagnosis:  Lumbar radiculopathy [M54.16]  Post Operative Diagnosis:  Lumbar radiculopathy [M54.16]  Location:  [address] [place] [address]    Procedure :    right L5 Selective Nerve Root Block  right S1 Selective Nerve Root Block    Consent:  Informed consent was obtained prior to the procedure. The patient was given the opportunity to ask questions regarding the procedure and its associated risks. In addition to the potential risks associated with the procedure itself, the patient was informed both verbally and in writing of the potential side effects of the use of glucocorticoid. The patient appeared to comprehend the informed consent and desired to have the procedure performed. Procedure: The patient was placed in the prone position on the fluoroscopy table and the back was prepped and draped in the usual sterile manner. The exact spinal level was  identified using fluoroscopy, and Lidocaine 1 % was injected locally, a 22 gauge spinal needle was passed to the transverse process. The depth was noted and the needle redirected to pass inferior and approximately one cm anterior to the transverse process. YES  1 cc of Isovue M-200 was used to verify positioning in the epidural and paravertebral space and outlined the course of the spinal nerve into the epidural space. The same procedure was repeated at each spinal level indicated above. No vascular uptake was identified. A total of 10 mg of preservative free Dexamethasone and 1 cc of Lidocaine/site was slowly injected. The patient tolerated the procedure well. The injection area was cleaned and bandaids applied. Not excessive bleeding was noted. Patient dressed and discharged to home with instructions. Discussion: The patient tolerated the procedure well.  Patient reported dae-procedural pain on Visual Analog Scale:  pre-5; post-0.                                               Jose Humphrey MD  September 20, 2022

## 2022-09-20 NOTE — INTERVAL H&P NOTE
Update History & Physical    The Patient's History and Physical of September 1, 2022 was reviewed. There was no change. The surgical site was confirmed by the patient and me. Plan:  The risk, benefits, expected outcome, and alternative to the recommended procedure have been discussed with the patient. Patient understands and wants to proceed with the procedure.     Electronically signed by Barry Giles MD on 9/20/2022 at 8:35 AM

## 2023-01-04 ENCOUNTER — APPOINTMENT (OUTPATIENT)
Dept: CT IMAGING | Age: 58
End: 2023-01-04
Attending: EMERGENCY MEDICINE

## 2023-01-04 ENCOUNTER — HOSPITAL ENCOUNTER (EMERGENCY)
Age: 58
Discharge: HOME | End: 2023-01-04
Attending: EMERGENCY MEDICINE

## 2023-01-04 VITALS
WEIGHT: 125 LBS | HEIGHT: 60 IN | TEMPERATURE: 98.2 F | HEART RATE: 60 BPM | SYSTOLIC BLOOD PRESSURE: 101 MMHG | RESPIRATION RATE: 20 BRPM | OXYGEN SATURATION: 93 % | BODY MASS INDEX: 24.54 KG/M2 | DIASTOLIC BLOOD PRESSURE: 66 MMHG

## 2023-01-04 DIAGNOSIS — R51.9 NONINTRACTABLE HEADACHE, UNSPECIFIED CHRONICITY PATTERN, UNSPECIFIED HEADACHE TYPE: Primary | ICD-10-CM

## 2023-01-04 LAB
COVID-19 RAPID TEST, COVR: NOT DETECTED
FLUAV AG NPH QL IA: NEGATIVE
FLUBV AG NOSE QL IA: NEGATIVE
SOURCE, COVRS: NORMAL

## 2023-01-04 PROCEDURE — 74011250636 HC RX REV CODE- 250/636: Performed by: EMERGENCY MEDICINE

## 2023-01-04 PROCEDURE — 96375 TX/PRO/DX INJ NEW DRUG ADDON: CPT

## 2023-01-04 PROCEDURE — 87635 SARS-COV-2 COVID-19 AMP PRB: CPT

## 2023-01-04 PROCEDURE — 96374 THER/PROPH/DIAG INJ IV PUSH: CPT

## 2023-01-04 PROCEDURE — 87804 INFLUENZA ASSAY W/OPTIC: CPT

## 2023-01-04 PROCEDURE — 70450 CT HEAD/BRAIN W/O DYE: CPT

## 2023-01-04 PROCEDURE — 99284 EMERGENCY DEPT VISIT MOD MDM: CPT

## 2023-01-04 RX ORDER — DEXAMETHASONE SODIUM PHOSPHATE 4 MG/ML
10 INJECTION, SOLUTION INTRA-ARTICULAR; INTRALESIONAL; INTRAMUSCULAR; INTRAVENOUS; SOFT TISSUE ONCE
Status: COMPLETED | OUTPATIENT
Start: 2023-01-04 | End: 2023-01-04

## 2023-01-04 RX ORDER — METOCLOPRAMIDE HYDROCHLORIDE 5 MG/ML
10 INJECTION INTRAMUSCULAR; INTRAVENOUS EVERY 6 HOURS
Status: DISCONTINUED | OUTPATIENT
Start: 2023-01-04 | End: 2023-01-04 | Stop reason: HOSPADM

## 2023-01-04 RX ORDER — LORAZEPAM 2 MG/ML
0.5 INJECTION INTRAMUSCULAR
Status: COMPLETED | OUTPATIENT
Start: 2023-01-04 | End: 2023-01-04

## 2023-01-04 RX ADMIN — DEXAMETHASONE SODIUM PHOSPHATE 10 MG: 4 INJECTION, SOLUTION INTRAMUSCULAR; INTRAVENOUS at 08:34

## 2023-01-04 RX ADMIN — METOCLOPRAMIDE 10 MG: 5 INJECTION, SOLUTION INTRAMUSCULAR; INTRAVENOUS at 08:34

## 2023-01-04 RX ADMIN — SODIUM CHLORIDE 1000 ML: 9 INJECTION, SOLUTION INTRAVENOUS at 08:33

## 2023-01-04 RX ADMIN — LORAZEPAM 0.5 MG: 2 INJECTION INTRAMUSCULAR; INTRAVENOUS at 08:34

## 2023-01-04 NOTE — ED TRIAGE NOTES
Patient with severe headache that started 3 days ago on the right side of her forehead. Patient has been taking Tylenol and Ibuprofen. Patient states he had vomiting last night and is very dizzy this morning. Patient also states that she is having chills.  Last does of Tylenol was at 1am

## 2023-01-04 NOTE — Clinical Note
[address] [facility] EMERGENCY DEPT  [address] [address] [phone]    Work/School Note    Date: 1/4/2023    To Whom It May concern:      Grayson Barnett was seen and treated today in the emergency room by the following provider(s):  Attending Provider: Mundo Hitchcock MD.      Grayson Barnett is excused from work/school on 01/04/23. She is clear to return to work/school on 01/05/23.         Sincerely,          Ashanti Pompa MD

## 2023-01-04 NOTE — ED PROVIDER NOTES
HER01/01    61 y.o. / female    Presents to the ED with   Chief Complaint   Patient presents with    Headache         HPI: 7:55 AM  The patient presented to the emergency department complaining of Headache. The patient notes that she has had a headache to the right side of her head, behind her right eye. She is having symptoms worsening over the past 4-6 hours. She denies any trauma. She has no history of chronic headaches. She is not on any anticoagulants. She took some over-the-counter medications at home without much symptomatic relief. She has a history of osteoporosis, arthritis, but denies any other chronic medical problems. Only prior surgical history C-sections. She describes her pain as dull, constant, without any relieving or exacerbating factors. No visual changes. No weakness. No change in her speech. No difficulty with ambulation. ROS:  14 organ system review of systems is complete and is negative except as addressed in the HPI        Social History:   Social History     Socioeconomic History    Marital status:      Spouse name: Not on file    Number of children: Not on file    Years of education: Not on file    Highest education level: Not on file   Occupational History    Not on file   Tobacco Use    Smoking status: Never    Smokeless tobacco: Never   Vaping Use    Vaping Use: Never used   Substance and Sexual Activity    Alcohol use:  Yes     Alcohol/week: 0.8 standard drinks     Types: 1 drink(s) per week    Drug use: No    Sexual activity: Yes     Partners: Male   Other Topics Concern    Not on file   Social History Narrative    Not on file     Social Determinants of Health     Financial Resource Strain: Not on file   Food Insecurity: Not on file   Transportation Needs: Not on file   Physical Activity: Not on file   Stress: Not on file   Social Connections: Not on file   Intimate Partner Violence: Not on file   Housing Stability: Not on file      reports that she has never smoked. She has never used smokeless tobacco.    Family History:   Family History   Problem Relation Age of Onset    Hypertension Mother     Cancer Father        Past Medical History:   Past Medical History:   Diagnosis Date    GERD (gastroesophageal reflux disease)     Headache(784.0)     Hypercholesterolemia     Insomnia              Past Surgical History:   Past Surgical History:   Procedure Laterality Date    HX  SECTION         Primary Care: None    Immunizations:     Medications:   Current Facility-Administered Medications:     metoclopramide HCl (REGLAN) injection 10 mg, 10 mg, IntraVENous, Q6H, Dates, Travon Carey MD, 10 mg at 23 2197    Current Outpatient Medications:     gabapentin (NEURONTIN) 300 mg capsule, One po qhs x 1 week then increase to one po bid, Disp: 60 Capsule, Rfl: 2    multivitamin (ONE A DAY) tablet, Take 1 Tab by mouth daily. , Disp: , Rfl:     Allergies: Allergies   Allergen Reactions    Penicillins Hives         Last Cath      Last Stress Test      Prior:ECHO              Physical Exam:  . Patient Vitals for the past 12 hrs:   Temp Pulse Resp BP SpO2   23 0826 -- -- -- 133/65 100 %   23 0816 -- -- -- 123/63 96 %   23 0806 -- -- -- 114/63 --   23 0746 -- -- -- 110/61 --   23 0711 98.2 °F (36.8 °C) 60 20 117/68 100 %     Gen: Well developed, well nourished 61 y.o. female  HEENT: Normocephalic, atraumatic. No scleral icterus. Extraocular movements intact. .  Normal mucous membranes. Uvula midline. Airway widely patent. Respiratory: No accessory muscle use. No wheeze, No rales, No rhonchi. Normal chest wall excursion. No subcutaneous air, no rib crepitus  Cardiovascular: Regular rhythm and rate, Normal pulses, Normal perfusion. No edema. Gastrointestinal: Non distended, Non tender, No masses. No ascites. No organomegaly. No evidence of trauma  Musculoskeletal: Full range of motion at all other tested joints.  No joint effusions. Neurological: Normal strength, Normal sensation. Normal speech. No ataxia. Cranial nerves II-XII normal as tested. Skin: No rash, petechia or purpura. Warm and dry  Psychiatric: No suicidal ideation, No homicidal ideation. No hallucinations. Organized thoughts. Normal mood. normal affect. Heme: No lymphadenopathy. Genitourinary: Deferred    Orders:   Orders Placed This Encounter    COVID-19 RAPID TEST    INFLUENZA A & B AG (RAPID TEST)    CT HEAD WO CONT    DROPLET ISOLATION    dexamethasone (DECADRON) 4 mg/mL injection 10 mg    LORazepam (ATIVAN) injection 0.5 mg    metoclopramide HCl (REGLAN) injection 10 mg    sodium chloride 0.9 % bolus infusion 1,000 mL       ECG:   Current:      Comparison: .    Imaging:   CT HEAD WO CONT    Result Date: 1/4/2023  No acute findings. Labs:  Labs Reviewed   COVID-19 RAPID TEST   INFLUENZA A & B AG (RAPID TEST)       EMERGENCY DEPARTMENT COURSE  10:18AM: The patient's headache is improved after treatment the ER. CT scan of the head does not show any acute pathology. Diagnosis:   1. Nonintractable headache, unspecified chronicity pattern, unspecified headache type          Disposition:  Discharged      Discharge Medications:   Current Discharge Medication List            Referral:     Follow-up Information    None            The patient has presented with significant concern that an emergency condition exists. The patient is a prudent layperson, and as such is protected by the prudent layperson standard. (This chart was created with dictation software and an EHR.   It may contain unintended unedited historical and or dictation errors)

## (undated) DEVICE — BANDAGE ADH W0.75XL3IN UNIV WVN FAB NAT GEN USE STRP N ADH

## (undated) DEVICE — TRAY MYEL SFTY +

## (undated) DEVICE — AVANOS* SHORT BEVEL NEEDLE: Brand: AVANOS

## (undated) DEVICE — (D)NDL SPNE 22GX15CM -- DISC BY MFR W/NO SUB

## (undated) DEVICE — SYR 10ML LUER LOK 1/5ML GRAD --

## (undated) DEVICE — MEDIA CONTRAST 10ML 200MG/ML 41%